# Patient Record
Sex: FEMALE
[De-identification: names, ages, dates, MRNs, and addresses within clinical notes are randomized per-mention and may not be internally consistent; named-entity substitution may affect disease eponyms.]

---

## 2024-06-11 ENCOUNTER — DASHBOARD ENCOUNTERS (OUTPATIENT)
Age: 24
End: 2024-06-11

## 2024-06-11 ENCOUNTER — OFFICE VISIT (OUTPATIENT)
Dept: URBAN - METROPOLITAN AREA TELEHEALTH 2 | Facility: TELEHEALTH | Age: 24
End: 2024-06-11
Payer: COMMERCIAL

## 2024-06-11 VITALS — WEIGHT: 146 LBS | HEIGHT: 65 IN | BODY MASS INDEX: 24.32 KG/M2

## 2024-06-11 DIAGNOSIS — K50.919 CROHN'S DISEASE WITH COMPLICATION, UNSPECIFIED GASTROINTESTINAL TRACT LOCATION: ICD-10-CM

## 2024-06-11 DIAGNOSIS — R11.11 VOMITING WITHOUT NAUSEA, UNSPECIFIED VOMITING TYPE: ICD-10-CM

## 2024-06-11 DIAGNOSIS — K59.09 CHRONIC CONSTIPATION: ICD-10-CM

## 2024-06-11 PROBLEM — 34000006: Status: ACTIVE | Noted: 2024-06-11

## 2024-06-11 PROCEDURE — 99203 OFFICE O/P NEW LOW 30 MIN: CPT | Performed by: INTERNAL MEDICINE

## 2024-06-11 RX ORDER — FAMOTIDINE 40 MG/1
TAKE 1 TABLET BY MOUTH TWICE DAILY TABLET, FILM COATED ORAL
Qty: 180 EACH | Refills: 0 | Status: ACTIVE | COMMUNITY

## 2024-06-11 RX ORDER — METRONIDAZOLE 500 MG/1
TABLET ORAL
Qty: 10 TABLET | Status: ACTIVE | COMMUNITY

## 2024-06-11 RX ORDER — DOXYCYCLINE 100 MG/1
CAPSULE ORAL
Qty: 14 EACH | Refills: 0 | Status: ACTIVE | COMMUNITY

## 2024-06-11 NOTE — HPI-TODAY'S VISIT:
This is a 23 you F who I am seeing for the first time for a 2nd opinion.   The patient presented to her PCP 6 months ago for bleeding hemorrhoids.  She had a colonoscopy the following day as was told she had Crohns disease.  She then had a pill camera performed which revealed inflammation which confirmed the diagnosis.  The asserts midabdominal pain which develooed March 2024 exacerbated with meals, especially beef, citrus fruis and juices and relieved with a bowel movement.  The pain is rated a 5, occurs daily.  She reports weight gain.  She denies a prior episode.  There is no family hx of IBD, Lupus, Sarcoid, or rheumatoid arthritis.  Injection was recommended for treatment however patient wanted a 2nd opinion first.   She admits to constipation.  Stool evacuation occurs every two days without the sensation of complete emptying.  She admits to straining.  Nothing is taken to relieve symtpoms.  Constipation has been and issue since high school.   Blood is noted on the bowl and tissue.   Labs were also performed and per the patient marker's were positive for Crohn's.  Famotdine 40 mg bid has improved her GERD symptoms.  She reports 2 episodes fo vomiting one week aport not associaed with nausea.

## 2024-07-22 ENCOUNTER — TELEPHONE ENCOUNTER (OUTPATIENT)
Dept: URBAN - METROPOLITAN AREA CLINIC 17 | Facility: CLINIC | Age: 24
End: 2024-07-22

## 2024-08-05 ENCOUNTER — OFFICE VISIT (OUTPATIENT)
Dept: URBAN - METROPOLITAN AREA TELEHEALTH 2 | Facility: TELEHEALTH | Age: 24
End: 2024-08-05
Payer: COMMERCIAL

## 2024-08-05 VITALS — HEIGHT: 65 IN | BODY MASS INDEX: 24.49 KG/M2 | WEIGHT: 147 LBS

## 2024-08-05 DIAGNOSIS — K60.2 ANAL FISSURE: ICD-10-CM

## 2024-08-05 DIAGNOSIS — K62.89 ANAL PAIN: ICD-10-CM

## 2024-08-05 DIAGNOSIS — K50.80 CROHN'S COLITIS: ICD-10-CM

## 2024-08-05 PROBLEM — 71833008: Status: ACTIVE | Noted: 2024-08-05

## 2024-08-05 PROCEDURE — 99212 OFFICE O/P EST SF 10 MIN: CPT | Performed by: INTERNAL MEDICINE

## 2024-08-05 RX ORDER — FAMOTIDINE 40 MG/1
TAKE 1 TABLET BY MOUTH TWICE DAILY TABLET, FILM COATED ORAL
Qty: 180 EACH | Refills: 0 | Status: ACTIVE | COMMUNITY

## 2024-08-05 NOTE — HPI-TODAY'S VISIT:
This is a 23-year-old female who is here for follow-up.  I first met her June 2024 when she was seeking a second opinion after being diagnosed November 2023 with Crohn's disease.  She presented to her gastroenterologist in [facility] with intermittent abdominal pain, constipation and rectal bleeding.  A colonoscopy demonstrated inflammation in the terminal ileum ascending colon and transverse colon.  Biopsies of the transverse colon and ascending colon demonstrated mild to moderate chronic active disease.  Biopsies were not obtained from the terminal ileum.  Internal hemorrhoids were noted in the rectum.  She was Preparation H was recommended for internal hemorrhoids and Remicade for Crohn's disease.  She opted for second opinion. Today the patient continues to have intermittent abdominal pain as well as rectal bleeding.  Preparation H has not minimized bleeding.  Despite resolution of constipation with stool softeners she continues to have pain in the anus with the passage of stool rated a 6.  Abdominal pain occurs weekly and can last up to several hours.  There is no family history of ulcerative colitis, Crohn's disease or other autoimmune diseases. She is just graduated from college in [place] and is contemplating her next career move.  She is also grappling with a diagnosis of Crohn's and is adverse to bimonthly infusions.  She is inquiring about other treatment options.